# Patient Record
(demographics unavailable — no encounter records)

---

## 2024-11-13 NOTE — PHYSICAL EXAM
[No Acute Distress] : no acute distress [Well Nourished] : well nourished [Well-Appearing] : well-appearing [Well Developed] : well developed [EOMI] : extraocular movements intact [Normal Outer Ear/Nose] : the outer ears and nose were normal in appearance [No Respiratory Distress] : no respiratory distress  [No Accessory Muscle Use] : no accessory muscle use [Clear to Auscultation] : lungs were clear to auscultation bilaterally [Normal Rate] : normal rate  [Regular Rhythm] : with a regular rhythm [Soft] : abdomen soft [Non-distended] : non-distended [Non Tender] : non-tender [No Rash] : no rash [Normal Affect] : the affect was normal [Normal Insight/Judgement] : insight and judgment were intact [de-identified] : Ambulates with walker

## 2024-11-13 NOTE — PHYSICAL EXAM
[No Acute Distress] : no acute distress [Well Nourished] : well nourished [Well-Appearing] : well-appearing [Well Developed] : well developed [EOMI] : extraocular movements intact [Normal Outer Ear/Nose] : the outer ears and nose were normal in appearance [No Respiratory Distress] : no respiratory distress  [No Accessory Muscle Use] : no accessory muscle use [Clear to Auscultation] : lungs were clear to auscultation bilaterally [Normal Rate] : normal rate  [Regular Rhythm] : with a regular rhythm [Soft] : abdomen soft [Non-distended] : non-distended [Non Tender] : non-tender [No Rash] : no rash [Normal Affect] : the affect was normal [Normal Insight/Judgement] : insight and judgment were intact [de-identified] : Ambulates with walker

## 2024-11-13 NOTE — HISTORY OF PRESENT ILLNESS
[FreeTextEntry1] : f/u of chronic conditions. [de-identified] : 70F with history of HTN, NIDDM, L breast cancer with bilateral mastectomy, history of fall with right hip fracture on 3/13 and ORIF on 3/14 at [facility], and recent hospitalization at [facility] for R hip abscess treated conservatively presenting for f/u of chronic conditions.  Patient recently had labs in August that revealed hypercalcemia 11.1 with PTH 61 (WNL) and vitamin D 88.3 (high). Reviewed symptoms of hypercalcemia with patient. Patient denies any worsened fatigue, palpitations. Patient continues to take vitamin D supplements. Per chart, has had past issues of intermittent abdominal pain and constipation.  She continues to have significant social concerns. She has lived in the same apartment for decades but has concerns with her landlords and not having power. She has been seen by social work for her housing problems as well as inquiries about a meal program. She is requesting possible completion of a [employer]WD form.  She has concerns today for congestion, but is otherwise without infectious symptoms. She also endorses tightness around her chest area. Unable to fully assess because visit was time-limited and patient's access-a-ride had arrived.

## 2024-11-13 NOTE — REVIEW OF SYSTEMS
[Joint Pain] : joint pain [Fever] : no fever [Chills] : no chills [Vision Problems] : no vision problems [FreeTextEntry4] : Congestion [FreeTextEntry5] : Chest tightness [FreeTextEntry9] : R hip [de-identified] : Dizziness standing up

## 2024-11-13 NOTE — PHYSICAL EXAM
[No Acute Distress] : no acute distress [Well Nourished] : well nourished [Well Developed] : well developed [Well-Appearing] : well-appearing [EOMI] : extraocular movements intact [Normal Outer Ear/Nose] : the outer ears and nose were normal in appearance [No Respiratory Distress] : no respiratory distress  [No Accessory Muscle Use] : no accessory muscle use [Clear to Auscultation] : lungs were clear to auscultation bilaterally [Normal Rate] : normal rate  [Regular Rhythm] : with a regular rhythm [Soft] : abdomen soft [Non Tender] : non-tender [Non-distended] : non-distended [No Rash] : no rash [Normal Affect] : the affect was normal [Normal Insight/Judgement] : insight and judgment were intact [de-identified] : Ambulates with walker

## 2024-11-13 NOTE — ASSESSMENT
[FreeTextEntry1] : Follow up in 5 weeks with Dr. Verma (Firm 4)  Will try to fill out GLWD form for patient this week  Case discussed with Dr. Pacheco.

## 2024-11-13 NOTE — HISTORY OF PRESENT ILLNESS
[FreeTextEntry1] :  f/u of chronic conditions [de-identified] : 70F with history of HTN, NIDDM, L breast cancer with bilateral mastectomy, history of fall with right hip fracture on 3/13 and ORIF on 3/14 at [facility], and recent hospitalization at [facility] for R hip abscess treated conservatively presenting for f/u of chronic conditions.  Patient recently had labs in August that revealed hypercalcemia 11.1 with PTH 61 (WNL) and vitamin D 88.3 (high). Reviewed symptoms of hypercalcemia with patient. Patient denies any worsened fatigue, palpitations. Patient continues to take vitamin D supplements. Per chart, has had past issues of intermittent abdominal pain and constipation.  She continues to have significant social concerns. She has lived in the same apartment for decades but has concerns with her landlords and not having power. She has been seen by social work for her housing problems as well as inquiries about a meal program. She is requesting possible completion of a GLWD form.  She has concerns today for congestion, but is otherwise without infectious symptoms. She also endorses tightness around her chest area. Unable to fully assess because visit was time-limited and patient's access-a-ride had arrived.

## 2024-11-13 NOTE — HISTORY OF PRESENT ILLNESS
[FreeTextEntry1] :  f/u of chronic conditions [de-identified] : 70F with history of HTN, NIDDM, L breast cancer with bilateral mastectomy, history of fall with right hip fracture on 3/13 and ORIF on 3/14 at [facility], and recent hospitalization at [facility] for R hip abscess treated conservatively presenting for f/u of chronic conditions.  Patient recently had labs in August that revealed hypercalcemia 11.1 with PTH 61 (WNL) and vitamin D 88.3 (high). Reviewed symptoms of hypercalcemia with patient. Patient denies any worsened fatigue, palpitations. Patient continues to take vitamin D supplements. Per chart, has had past issues of intermittent abdominal pain and constipation.  She continues to have significant social concerns. She has lived in the same apartment for decades but has concerns with her landlords and not having power. She has been seen by social work for her housing problems as well as inquiries about a meal program. She is requesting possible completion of a GLWD form.  She has concerns today for congestion, but is otherwise without infectious symptoms. She also endorses tightness around her chest area. Unable to fully assess because visit was time-limited and patient's access-a-ride had arrived.

## 2024-11-13 NOTE — REVIEW OF SYSTEMS
[Joint Pain] : joint pain [Fever] : no fever [Chills] : no chills [Vision Problems] : no vision problems [FreeTextEntry4] : Congestion [FreeTextEntry5] : Chest tightness [FreeTextEntry9] : R hip [de-identified] : Dizziness standing up

## 2024-11-13 NOTE — END OF VISIT
[] : Resident [FreeTextEntry3] : abridged visit today, as pt's transportation was waiting.  labwork today for previous finding of hypercalcemia with elevated 1,25 vit D. repeat labs today, if 25OH vit D normal and hypercalcemia persists, can consider granulomatous disease such as sarcoid pt has a remote history of breast cancer s/p b/l mastectomy. imaging probably not necessary, can f/u with cancer survivorship program for appropriate monitoring, but may warrant annual physical exam of the surgical site and surrounding areas.

## 2024-11-13 NOTE — REVIEW OF SYSTEMS
[Fever] : no fever [Chills] : no chills [Vision Problems] : no vision problems [FreeTextEntry4] : Congestion [FreeTextEntry5] : Chest tightness [FreeTextEntry9] : R hip joint pain [de-identified] : Dizziness standing up

## 2024-11-13 NOTE — ASSESSMENT
[FreeTextEntry1] : Follow up in 5 weeks with Dr. Verma (Firm 4)  Will try to fill out GLWD form for patient this week  Case discussed with Dr. Pacheco

## 2024-11-13 NOTE — REVIEW OF SYSTEMS
[Joint Pain] : joint pain [Fever] : no fever [Chills] : no chills [Vision Problems] : no vision problems [FreeTextEntry4] : Congestion [FreeTextEntry5] : Chest tightness [FreeTextEntry9] : R hip [de-identified] : Dizziness standing up

## 2024-11-13 NOTE — HISTORY OF PRESENT ILLNESS
[FreeTextEntry1] :  f/u of chronic conditions [de-identified] : 70F with history of HTN, NIDDM, L breast cancer with bilateral mastectomy, history of fall with right hip fracture on 3/13 and ORIF on 3/14 at [facility], and recent hospitalization at [facility] for R hip abscess treated conservatively presenting for f/u of chronic conditions.  Patient recently had labs in August that revealed hypercalcemia 11.1 with PTH 61 (WNL) and vitamin D 88.3 (high). Reviewed symptoms of hypercalcemia with patient. Patient denies any worsened fatigue, palpitations. Patient continues to take vitamin D supplements. Per chart, has had past issues of intermittent abdominal pain and constipation.  She continues to have significant social concerns. She has lived in the same apartment for decades but has concerns with her landlords and not having power. She has been seen by social work for her housing problems as well as inquiries about a meal program. She is requesting possible completion of a GLWD form.  She has concerns today for congestion, but is otherwise without infectious symptoms. She also endorses tightness around her chest area. Unable to fully assess because visit was time-limited and patient's access-a-ride had arrived.

## 2024-11-13 NOTE — PHYSICAL EXAM
[No Acute Distress] : no acute distress [Well Nourished] : well nourished [Well-Appearing] : well-appearing [Well Developed] : well developed [EOMI] : extraocular movements intact [Normal Outer Ear/Nose] : the outer ears and nose were normal in appearance [No Respiratory Distress] : no respiratory distress  [No Accessory Muscle Use] : no accessory muscle use [Clear to Auscultation] : lungs were clear to auscultation bilaterally [Normal Rate] : normal rate  [Regular Rhythm] : with a regular rhythm [Soft] : abdomen soft [Non-distended] : non-distended [Non Tender] : non-tender [No Rash] : no rash [Normal Affect] : the affect was normal [Normal Insight/Judgement] : insight and judgment were intact [de-identified] : Ambulates with walker

## 2024-12-18 NOTE — PHYSICAL EXAM
[No Acute Distress] : no acute distress [Well Nourished] : well nourished [Well Developed] : well developed [Well-Appearing] : well-appearing [EOMI] : extraocular movements intact [Normal Outer Ear/Nose] : the outer ears and nose were normal in appearance [Supple] : supple [No Respiratory Distress] : no respiratory distress  [No Accessory Muscle Use] : no accessory muscle use [Clear to Auscultation] : lungs were clear to auscultation bilaterally [Normal Rate] : normal rate  [Regular Rhythm] : with a regular rhythm [No Murmur] : no murmur heard [No Edema] : there was no peripheral edema [Soft] : abdomen soft [Non Tender] : non-tender [Non-distended] : non-distended [Normal Bowel Sounds] : normal bowel sounds [Coordination Grossly Intact] : coordination grossly intact [No Focal Deficits] : no focal deficits [Alert and Oriented x3] : oriented to person, place, and time [de-identified] : Mildly erythematous pharynx [de-identified] : s/p bilateral mastectomy; healed scars, no significant breast tissue remaining, no tenderness to palpation [de-identified] : Ambulates with walker

## 2024-12-18 NOTE — END OF VISIT
[] : Resident [FreeTextEntry3] : breast exam conducted with Dr Bayron Lu b/l mastectomy scars, no masses appreciated. mild hypercalcemia, with high-normal PTH, low 25OH Vit D and mildly elevated 1,25OH Vit D - consider primary hyperparathyroidism vs sarcoid - check CXR; monitor Ca/PTH/vit D/phos at next visit  significant utility issues at home as without electricity for > 1 year. cannot store food or cook food. need to look into other options?

## 2024-12-18 NOTE — HISTORY OF PRESENT ILLNESS
[FreeTextEntry1] : F/u for hypercalcemia workup  [de-identified] : 70F with history of HTN, NIDDM, L breast cancer with bilateral mastectomy, history of fall with right hip fracture on 3/13 and ORIF on 3/14 at [facility], and R hip abscess treated conservatively presenting for f/u of hypercalcemia workup.  Calcium from last visit persistently elevated at 10.7 with PTH at 63 (near upper limit). Vitamin D low at 16.8. Patient unable to get DEXA scan in interim. Symptomatically, patient has not changed significantly. She denies fatigue, fevers, cough, palpitations, flank pain, nephrolithiasis, myalgias, or constipation. She denies depression, but expresses overall frustration at her living situation. She endorses >10lb weight loss over the past 6 months. She endorses some weakness but related to not getting physical therapy. When discussing possible etiology and management, patient states that she does not want to undergo any kind of surgery because of prior poor experiences with the healthcare system.   Patient continues to have mucus in her throat with abnormal taste sensation. No associated cough. Intermittently has some nasal discharge. States that she is not aware of any issues with GERD or allergies. She was prescribed Flonase in the past but did not use it because she did not like the taste in her mouth afterwards.  Patient previously requested [employer]WD for food assistance but was rejected. Discussed with .

## 2024-12-18 NOTE — PHYSICAL EXAM
[No Acute Distress] : no acute distress [Well Nourished] : well nourished [Well Developed] : well developed [Well-Appearing] : well-appearing [EOMI] : extraocular movements intact [Normal Outer Ear/Nose] : the outer ears and nose were normal in appearance [Supple] : supple [No Respiratory Distress] : no respiratory distress  [No Accessory Muscle Use] : no accessory muscle use [Clear to Auscultation] : lungs were clear to auscultation bilaterally [Normal Rate] : normal rate  [Regular Rhythm] : with a regular rhythm [No Murmur] : no murmur heard [No Edema] : there was no peripheral edema [Soft] : abdomen soft [Non Tender] : non-tender [Non-distended] : non-distended [Normal Bowel Sounds] : normal bowel sounds [Coordination Grossly Intact] : coordination grossly intact [No Focal Deficits] : no focal deficits [Alert and Oriented x3] : oriented to person, place, and time [de-identified] : Mildly erythematous pharynx [de-identified] : s/p bilateral mastectomy; healed scars, no significant breast tissue remaining, no tenderness to palpation [de-identified] : Ambulates with walker

## 2024-12-18 NOTE — HISTORY OF PRESENT ILLNESS
[FreeTextEntry1] : F/u for hypercalcemia workup  [de-identified] : 70F with history of HTN, NIDDM, L breast cancer with bilateral mastectomy, history of fall with right hip fracture on 3/13 and ORIF on 3/14 at [facility], and R hip abscess treated conservatively presenting for f/u of hypercalcemia workup.  Calcium from last visit persistently elevated at 10.7 with PTH at 63 (near upper limit). Vitamin D low at 16.8. Patient unable to get DEXA scan in interim. Symptomatically, patient has not changed significantly. She denies fatigue, fevers, cough, palpitations, flank pain, nephrolithiasis, myalgias, or constipation. She denies depression, but expresses overall frustration at her living situation. She endorses >10lb weight loss over the past 6 months. She endorses some weakness but related to not getting physical therapy. When discussing possible etiology and management, patient states that she does not want to undergo any kind of surgery because of prior poor experiences with the healthcare system.   Patient continues to have mucus in her throat with abnormal taste sensation. No associated cough. Intermittently has some nasal discharge. States that she is not aware of any issues with GERD or allergies. She was prescribed Flonase in the past but did not use it because she did not like the taste in her mouth afterwards.  Patient previously requested [employer]WD for food assistance but was rejected. Discussed with .

## 2024-12-18 NOTE — ASSESSMENT
[FreeTextEntry1] : F/u with Dr. Verma (Firm 4) in 10 weeks for hypercalcemia workup; patient should come once DEXA and XR are completed  Case discussed with Dr. Pacheco

## 2024-12-18 NOTE — REVIEW OF SYSTEMS
[Recent Change In Weight] : ~T recent weight change [Nasal Discharge] : nasal discharge [Postnasal Drip] : postnasal drip [Fever] : no fever [Fatigue] : no fatigue [Discharge] : no discharge [Hearing Loss] : no hearing loss [Palpitations] : no palpitations [Cough] : no cough [Abdominal Pain] : no abdominal pain [Constipation] : no constipation [Heartburn] : no heartburn [Dysuria] : no dysuria [Frequency] : no frequency [Muscle Pain] : no muscle pain [Depression] : no depression [FreeTextEntry3] : occasional blurring [FreeTextEntry5] : Chest "pulling"

## 2024-12-18 NOTE — HISTORY OF PRESENT ILLNESS
[FreeTextEntry1] : F/u for hypercalcemia workup  [de-identified] : 70F with history of HTN, NIDDM, L breast cancer with bilateral mastectomy, history of fall with right hip fracture on 3/13 and ORIF on 3/14 at [facility], and R hip abscess treated conservatively presenting for f/u of hypercalcemia workup.  Calcium from last visit persistently elevated at 10.7 with PTH at 63 (near upper limit). Vitamin D low at 16.8. Patient unable to get DEXA scan in interim. Symptomatically, patient has not changed significantly. She denies fatigue, fevers, cough, palpitations, flank pain, nephrolithiasis, myalgias, or constipation. She denies depression, but expresses overall frustration at her living situation. She endorses >10lb weight loss over the past 6 months. She endorses some weakness but related to not getting physical therapy. When discussing possible etiology and management, patient states that she does not want to undergo any kind of surgery because of prior poor experiences with the healthcare system.   Patient continues to have mucus in her throat with abnormal taste sensation. No associated cough. Intermittently has some nasal discharge. States that she is not aware of any issues with GERD or allergies. She was prescribed Flonase in the past but did not use it because she did not like the taste in her mouth afterwards.  Patient previously requested [employer]WD for food assistance but was rejected. Discussed with .